# Patient Record
Sex: FEMALE | Race: WHITE | URBAN - METROPOLITAN AREA
[De-identification: names, ages, dates, MRNs, and addresses within clinical notes are randomized per-mention and may not be internally consistent; named-entity substitution may affect disease eponyms.]

---

## 2017-12-11 ENCOUNTER — EMERGENCY (EMERGENCY)
Facility: HOSPITAL | Age: 77
LOS: 1 days | Discharge: HOME | End: 2017-12-11
Attending: EMERGENCY MEDICINE
Payer: MEDICARE

## 2017-12-11 VITALS
RESPIRATION RATE: 16 BRPM | TEMPERATURE: 97 F | DIASTOLIC BLOOD PRESSURE: 73 MMHG | OXYGEN SATURATION: 99 % | HEART RATE: 68 BPM | SYSTOLIC BLOOD PRESSURE: 155 MMHG

## 2017-12-11 VITALS
TEMPERATURE: 98 F | HEART RATE: 93 BPM | RESPIRATION RATE: 17 BRPM | DIASTOLIC BLOOD PRESSURE: 94 MMHG | SYSTOLIC BLOOD PRESSURE: 173 MMHG | OXYGEN SATURATION: 98 %

## 2017-12-11 PROCEDURE — 99284 EMERGENCY DEPT VISIT MOD MDM: CPT

## 2017-12-11 PROCEDURE — 99285 EMERGENCY DEPT VISIT HI MDM: CPT

## 2017-12-11 NOTE — ED ADULT NURSE NOTE - OBJECTIVE STATEMENT
pt  Mitul stated that patient jumped out of window and went out. pt a/o x1. roam alert applied to patient.  at bedside. pt placed close to nursing station for safety.

## 2017-12-11 NOTE — ED PROVIDER NOTE - MEDICAL DECISION MAKING DETAILS
76 y/o F pt found wandering. No apparent illnesses or injuries.  prefers to take pt home and to f/u with PMD in x1-2 days. Pt to return to ED with any new or worsening sx's.

## 2017-12-11 NOTE — ED PROVIDER NOTE - OBJECTIVE STATEMENT
76 y/o F pt with PMHx of Dementia and Paranoia and no significant PSHx BIB [facility] to ED with AMS after being found wandering today. Pt's  states pt climbed out of a window last night. Per , pt has a Hx of dementia, and has wandered in the past as well. In ED, pt's behavior is noted to be at baseline with pt denying any complaints. Pt denies fever, chills, CP, SOB, abd pain, focal weakness, paresthesias, or any other complaints. Pt also denies recent injuries. NKDA.

## 2017-12-11 NOTE — ED PROVIDER NOTE - CHPI ED SYMPTOMS NEG
no fever/no chills, no chest pain, no shortness of breath, no abd pain, no focal weakness, no paresthesias

## 2017-12-11 NOTE — ED PROVIDER NOTE - PROGRESS NOTE DETAILS
Case manage paged.  states he has no services at home and would like to connect with services.  is on break.  does not want to wait. I will give her information to f/u with patient at home.

## 2017-12-11 NOTE — ED ADULT TRIAGE NOTE - CHIEF COMPLAINT QUOTE
trip and fell striking the back of her head.  Negative loc wandering wandering since last night after climbing out of the window

## 2018-01-30 ENCOUNTER — OUTPATIENT (OUTPATIENT)
Dept: OUTPATIENT SERVICES | Facility: HOSPITAL | Age: 78
LOS: 1 days | Discharge: HOME | End: 2018-01-30
Payer: MEDICARE

## 2018-01-31 DIAGNOSIS — G30.9 ALZHEIMER'S DISEASE, UNSPECIFIED: ICD-10-CM

## 2018-02-20 PROCEDURE — 99214 OFFICE O/P EST MOD 30 MIN: CPT

## 2018-03-14 PROCEDURE — 99214 OFFICE O/P EST MOD 30 MIN: CPT

## 2018-03-21 ENCOUNTER — INPATIENT (INPATIENT)
Facility: HOSPITAL | Age: 78
LOS: 5 days | Discharge: NURSING, REHAB OR LONG-TERM CARE | DRG: 57 | End: 2018-03-27
Attending: STUDENT IN AN ORGANIZED HEALTH CARE EDUCATION/TRAINING PROGRAM | Admitting: STUDENT IN AN ORGANIZED HEALTH CARE EDUCATION/TRAINING PROGRAM
Payer: MEDICARE

## 2018-03-21 VITALS
TEMPERATURE: 98 F | OXYGEN SATURATION: 98 % | SYSTOLIC BLOOD PRESSURE: 133 MMHG | HEART RATE: 68 BPM | RESPIRATION RATE: 17 BRPM | DIASTOLIC BLOOD PRESSURE: 85 MMHG

## 2018-03-21 DIAGNOSIS — R45.1 RESTLESSNESS AND AGITATION: ICD-10-CM

## 2018-03-21 DIAGNOSIS — Z01.89 ENCOUNTER FOR OTHER SPECIFIED SPECIAL EXAMINATIONS: ICD-10-CM

## 2018-03-21 DIAGNOSIS — R41.82 ALTERED MENTAL STATUS, UNSPECIFIED: ICD-10-CM

## 2018-03-21 DIAGNOSIS — F03.90 UNSPECIFIED DEMENTIA WITHOUT BEHAVIORAL DISTURBANCE: ICD-10-CM

## 2018-03-21 DIAGNOSIS — Z29.9 ENCOUNTER FOR PROPHYLACTIC MEASURES, UNSPECIFIED: ICD-10-CM

## 2018-03-21 DIAGNOSIS — F20.9 SCHIZOPHRENIA, UNSPECIFIED: ICD-10-CM

## 2018-03-21 DIAGNOSIS — Z71.89 OTHER SPECIFIED COUNSELING: ICD-10-CM

## 2018-03-21 LAB
ALBUMIN SERPL ELPH-MCNC: 3.4 G/DL — LOW (ref 3.5–5)
ALP SERPL-CCNC: 76 U/L — SIGNIFICANT CHANGE UP (ref 40–120)
ALT FLD-CCNC: 17 U/L DA — SIGNIFICANT CHANGE UP (ref 10–60)
ANION GAP SERPL CALC-SCNC: 7 MMOL/L — SIGNIFICANT CHANGE UP (ref 5–17)
APPEARANCE UR: CLEAR — SIGNIFICANT CHANGE UP
AST SERPL-CCNC: 12 U/L — SIGNIFICANT CHANGE UP (ref 10–40)
BACTERIA # UR AUTO: ABNORMAL /HPF
BASOPHILS # BLD AUTO: 0.1 K/UL — SIGNIFICANT CHANGE UP (ref 0–0.2)
BASOPHILS NFR BLD AUTO: 0.8 % — SIGNIFICANT CHANGE UP (ref 0–2)
BILIRUB SERPL-MCNC: 0.2 MG/DL — SIGNIFICANT CHANGE UP (ref 0.2–1.2)
BILIRUB UR-MCNC: NEGATIVE — SIGNIFICANT CHANGE UP
BUN SERPL-MCNC: 19 MG/DL — HIGH (ref 7–18)
CALCIUM SERPL-MCNC: 9 MG/DL — SIGNIFICANT CHANGE UP (ref 8.4–10.5)
CHLORIDE SERPL-SCNC: 107 MMOL/L — SIGNIFICANT CHANGE UP (ref 96–108)
CO2 SERPL-SCNC: 26 MMOL/L — SIGNIFICANT CHANGE UP (ref 22–31)
COLOR SPEC: SIGNIFICANT CHANGE UP
COMMENT - URINE: SIGNIFICANT CHANGE UP
CREAT SERPL-MCNC: 0.94 MG/DL — SIGNIFICANT CHANGE UP (ref 0.5–1.3)
DIFF PNL FLD: ABNORMAL
EOSINOPHIL # BLD AUTO: 0.1 K/UL — SIGNIFICANT CHANGE UP (ref 0–0.5)
EOSINOPHIL NFR BLD AUTO: 0.7 % — SIGNIFICANT CHANGE UP (ref 0–6)
EPI CELLS # UR: ABNORMAL /HPF
GLUCOSE SERPL-MCNC: 95 MG/DL — SIGNIFICANT CHANGE UP (ref 70–99)
GLUCOSE UR QL: NEGATIVE — SIGNIFICANT CHANGE UP
HCT VFR BLD CALC: 36.2 % — SIGNIFICANT CHANGE UP (ref 34.5–45)
HGB BLD-MCNC: 11.5 G/DL — SIGNIFICANT CHANGE UP (ref 11.5–15.5)
KETONES UR-MCNC: NEGATIVE — SIGNIFICANT CHANGE UP
LEUKOCYTE ESTERASE UR-ACNC: ABNORMAL
LYMPHOCYTES # BLD AUTO: 1.3 K/UL — SIGNIFICANT CHANGE UP (ref 1–3.3)
LYMPHOCYTES # BLD AUTO: 16.5 % — SIGNIFICANT CHANGE UP (ref 13–44)
MCHC RBC-ENTMCNC: 28.9 PG — SIGNIFICANT CHANGE UP (ref 27–34)
MCHC RBC-ENTMCNC: 31.7 GM/DL — LOW (ref 32–36)
MCV RBC AUTO: 91.4 FL — SIGNIFICANT CHANGE UP (ref 80–100)
MONOCYTES # BLD AUTO: 0.6 K/UL — SIGNIFICANT CHANGE UP (ref 0–0.9)
MONOCYTES NFR BLD AUTO: 7.2 % — SIGNIFICANT CHANGE UP (ref 2–14)
NEUTROPHILS # BLD AUTO: 6.1 K/UL — SIGNIFICANT CHANGE UP (ref 1.8–7.4)
NEUTROPHILS NFR BLD AUTO: 74.8 % — SIGNIFICANT CHANGE UP (ref 43–77)
NITRITE UR-MCNC: NEGATIVE — SIGNIFICANT CHANGE UP
PH UR: 5 — SIGNIFICANT CHANGE UP (ref 5–8)
PLATELET # BLD AUTO: 193 K/UL — SIGNIFICANT CHANGE UP (ref 150–400)
POTASSIUM SERPL-MCNC: 4.2 MMOL/L — SIGNIFICANT CHANGE UP (ref 3.5–5.3)
POTASSIUM SERPL-SCNC: 4.2 MMOL/L — SIGNIFICANT CHANGE UP (ref 3.5–5.3)
PROT SERPL-MCNC: 7.4 G/DL — SIGNIFICANT CHANGE UP (ref 6–8.3)
PROT UR-MCNC: NEGATIVE — SIGNIFICANT CHANGE UP
RBC # BLD: 3.96 M/UL — SIGNIFICANT CHANGE UP (ref 3.8–5.2)
RBC # FLD: 14.1 % — SIGNIFICANT CHANGE UP (ref 10.3–14.5)
RBC CASTS # UR COMP ASSIST: SIGNIFICANT CHANGE UP /HPF (ref 0–2)
SODIUM SERPL-SCNC: 140 MMOL/L — SIGNIFICANT CHANGE UP (ref 135–145)
SP GR SPEC: 1.01 — SIGNIFICANT CHANGE UP (ref 1.01–1.02)
TROPONIN I SERPL-MCNC: <0.015 NG/ML — SIGNIFICANT CHANGE UP (ref 0–0.04)
UROBILINOGEN FLD QL: NEGATIVE — SIGNIFICANT CHANGE UP
WBC # BLD: 8.1 K/UL — SIGNIFICANT CHANGE UP (ref 3.8–10.5)
WBC # FLD AUTO: 8.1 K/UL — SIGNIFICANT CHANGE UP (ref 3.8–10.5)
WBC UR QL: SIGNIFICANT CHANGE UP /HPF (ref 0–5)

## 2018-03-21 PROCEDURE — 70450 CT HEAD/BRAIN W/O DYE: CPT | Mod: 26

## 2018-03-21 PROCEDURE — 71045 X-RAY EXAM CHEST 1 VIEW: CPT | Mod: 26

## 2018-03-21 PROCEDURE — 99285 EMERGENCY DEPT VISIT HI MDM: CPT

## 2018-03-21 RX ORDER — QUETIAPINE FUMARATE 200 MG/1
25 TABLET, FILM COATED ORAL
Qty: 0 | Refills: 0 | Status: DISCONTINUED | OUTPATIENT
Start: 2018-03-21 | End: 2018-03-22

## 2018-03-21 RX ORDER — ENOXAPARIN SODIUM 100 MG/ML
40 INJECTION SUBCUTANEOUS DAILY
Qty: 0 | Refills: 0 | Status: DISCONTINUED | OUTPATIENT
Start: 2018-03-21 | End: 2018-03-27

## 2018-03-21 RX ORDER — LANOLIN ALCOHOL/MO/W.PET/CERES
5 CREAM (GRAM) TOPICAL AT BEDTIME
Qty: 0 | Refills: 0 | Status: DISCONTINUED | OUTPATIENT
Start: 2018-03-21 | End: 2018-03-27

## 2018-03-21 RX ORDER — SODIUM CHLORIDE 9 MG/ML
1000 INJECTION, SOLUTION INTRAVENOUS
Qty: 0 | Refills: 0 | Status: DISCONTINUED | OUTPATIENT
Start: 2018-03-21 | End: 2018-03-27

## 2018-03-21 RX ADMIN — QUETIAPINE FUMARATE 25 MILLIGRAM(S): 200 TABLET, FILM COATED ORAL at 21:01

## 2018-03-21 RX ADMIN — Medication 5 MILLIGRAM(S): at 21:46

## 2018-03-21 NOTE — H&P ADULT - PROBLEM SELECTOR PLAN 4
IMPROVE VTE score: 4 Lovenox S/C  [ ] Previous VTE                                    3  [ ] Thrombophilia                                  2  [ x] Lower limb paralysis                        2  (unable to hold up >15 seconds)    [ ] Current Cancer (within 6 months)        2   [x] Immobilization > 24 hrs                    1  [ ] ICU/CCU stay > 24 hrs                      1  [x] Age > 60                                         1

## 2018-03-21 NOTE — H&P ADULT - ATTENDING COMMENTS
Agree with above   Patient was seen and examined in ED with her son and  being present during examination. She is 78 y/o Female from home lives with  w/ significant PMHx of Dementia and Agitation (recently started Olanzapine as outpatient) and no significant PSHx BIB family with c/o altered mental status since 7pm last night.   Per patient ,  patient soiled her self at 7pm last night that was a deviation from pt's baseline. Patient's  reports the pt soiled her self a second time last night while watching TV and while allow pt to clean herself she again experienced confusion. Patient was placed in bed at 10pm, but she awoke x5 times in the night with episodes of hyperactivity. Patient was reported to be erratic and very disoriented around their home.   ROS and PE as above     1. Acute change in altered mental status  No signs of infection so far: afebrile, labs within normal limits, UA (-), CXR (-)  CT head negative   Will get MRI of brain   Hold olanzipine for now   Can give Seroquel 12.5 mg PO at bedtime   Will get Psychiatric evaluation   Neuro evaluation depending on MRI reports    The rest as above     Plan of care discussed with patient  and son

## 2018-03-21 NOTE — H&P ADULT - HISTORY OF PRESENT ILLNESS
76 y/o Female w/ significant PMHx of dementia and paranoia(Schizophrenia) and no significant PSHx BIB family with c/o altered mental status since 7pm last night. Per pt's  pt soiled her self at 7pm last night that was a deviation from pt's baseline. Pt's  notes that while attempting to allow the pt to clean herself, pt appears to be confused by the function of soap. Pt's  reports the pt soiled her self a second time last night while watching TV and while allow pt to clean herself she again experienced confusion. Pt was placed in bed at 10pm, but she awoke x5 times in the night with episodes of hyperactivity. Pt was reported to be "running around the house" and "climbing fences". Pt's  reports that the pt visits a psychiatrist at [facility]. Pt'  denies observing any vomiting, pain, apparent injuries, falls, recent fevers, diarrhea, blood in stool, SOB, urinary symptoms, or any other complaints. Pt is current taking Olanzapine. Allergie to penicillin. PMD Dr. Gabriel Villalobos. 78 y/o Female w/ significant PMHx of Dementia and Paranoia(Schizophrenia on olanzapine) and no significant PSHx BIB family with c/o altered mental status since 7pm last night. Per patient  patient soiled her self at 7pm last night that was a deviation from pt's baseline. Pt's  notes that while attempting to allow the pt to clean herself. Pt's  reports the pt soiled her self a second time last night while watching TV and while allow pt to clean herself she again experienced confusion. Pt was placed in bed at 10pm, but she awoke x5 times in the night with episodes of hyperactivity. Pt was reported to be "running around the house" and "climbing fences". Pt's  reports that the pt visits a psychiatrist at [facility] who gave her the olanzapine.   Arabellanet  denies observing any vomiting, pain, apparent injuries, falls, recent fevers, diarrhea, blood in stool, SOB, urinary symptoms, or any other complaints. 78 y/o Female from home lives with  w/ significant PMHx of Dementia and Agitation (PCP on olanzapine) and no significant PSHx BIB family with c/o altered mental status since 7pm last night. Per patient  patient soiled her self at 7pm last night that was a deviation from pt's baseline. Pt's  notes that while attempting to allow the pt to clean herself. Pt's  reports the pt soiled her self a second time last night while watching TV and while allow pt to clean herself she again experienced confusion. Pt was placed in bed at 10pm, but she awoke x5 times in the night with episodes of hyperactivity. Pt was reported to be "running around the house" and "climbing fences". Pt's  reports that the pt visits a psychiatrist at [facility] who gave her the olanzapine.   Patient  denies observing any vomiting, pain, apparent injuries, falls, recent fevers, diarrhea, blood in stool, SOB, urinary symptoms, or any other complaints. 76 y/o Female from home lives with  w/ significant PMHx of Dementia and Agitation Psychiatry outpatient started her on olanzapine) and no significant PSHx BIB family with c/o altered mental status since 7pm last night. Per patient  patient soiled her self at 7pm last night that was a deviation from pt's baseline. Pt's  notes that while attempting to allow the pt to clean herself. Pt's  reports the pt soiled her self a second time last night while watching TV and while allow pt to clean herself she again experienced confusion. Pt was placed in bed at 10pm, but she awoke x5 times in the night with episodes of hyperactivity. Pt was reported to be "running around the house" and "climbing fences". Pt's  reports that the pt visits a psychiatrist at [facility] who gave her the olanzapine.   Patient  denies observing any vomiting, pain, apparent injuries, falls, recent fevers, diarrhea, blood in stool, SOB, urinary symptoms, or any other complaints. 76 y/o Female from home lives with  w/ significant PMHx of Dementia and Agitation Psychiatry outpatient started her on olanzapine) and no significant PSHx BIB family with c/o altered mental status since 7pm last night. Per patient  patient soiled her self at 7pm last night that was a deviation from pt's baseline.Patient's  reports the pt soiled her self a second time last night while watching TV and while allow pt to clean herself she again experienced confusion. Patient was placed in bed at 10pm, but she awoke x5 times in the night with episodes of hyperactivity. Patient was reported to be erratic and very disoriented around their home. Patient's  reports that the pt visits a psychiatrist at [facility] who gave her the olanzapine.   Patient  denies observing any vomiting, pain, apparent injuries, falls, recent fevers, diarrhea, blood in stool, SOB, urinary symptoms, or any other complaints.

## 2018-03-21 NOTE — ED PROVIDER NOTE - OBJECTIVE STATEMENT
76 y/o F w/ significant PMHx of dementia and paranoia and no significant PSHx BIB family with c/o altered mental status since 7pm last night. Per pt's  pt soiled her self at 7pm last night that was a deviation from pt's baseline. Pt's  notes that while attempting to allow the pt to clean herself, pt appears to be confused by the function of soap. Pt's  reports the pt soiled her self a second time last night while watching TV and while allow pt to clean herself she again experienced confusion. Pt was placed in bed at 10pm, but she awoke x5 times in the night with episodes of hyperactivity. Pt was reported to be "running around the house" and "climbing fences". Pt's  reports that the pt visits a psychiatrist at [facility]. Pt'  denies observing any vomiting, pain, apparent injuries, falls, recent fevers, diarrhea, blood in stool, SOB, urinary symptoms, or any other complaints. NKDA. PMD Dr. Gabriel Villalobos. 78 y/o F w/ significant PMHx of dementia and paranoia and no significant PSHx BIB family with c/o altered mental status since 7pm last night. Per pt's  pt soiled her self at 7pm last night that was a deviation from pt's baseline. Pt's  notes that while attempting to allow the pt to clean herself, pt appears to be confused by the function of soap. Pt's  reports the pt soiled her self a second time last night while watching TV and while allow pt to clean herself she again experienced confusion. Pt was placed in bed at 10pm, but she awoke x5 times in the night with episodes of hyperactivity. Pt was reported to be "running around the house" and "climbing fences". Pt's  reports that the pt visits a psychiatrist at [facility]. Pt'  denies observing any vomiting, pain, apparent injuries, falls, recent fevers, diarrhea, blood in stool, SOB, urinary symptoms, or any other complaints. Allergie to penicillin. PMD Dr. Gabriel Villalobos. 78 y/o F w/ significant PMHx of dementia and paranoia and no significant PSHx BIB family with c/o altered mental status since 7pm last night. Per pt's  pt soiled her self at 7pm last night that was a deviation from pt's baseline. Pt's  notes that while attempting to allow the pt to clean herself, pt appears to be confused by the function of soap. Pt's  reports the pt soiled her self a second time last night while watching TV and while allow pt to clean herself she again experienced confusion. Pt was placed in bed at 10pm, but she awoke x5 times in the night with episodes of hyperactivity. Pt was reported to be "running around the house" and "climbing fences". Pt's  reports that the pt visits a psychiatrist at [facility]. Pt'  denies observing any vomiting, pain, apparent injuries, falls, recent fevers, diarrhea, blood in stool, SOB, urinary symptoms, or any other complaints. Pt is current taking Olanzapine. Allergie to penicillin. PMD Dr. Gabriel Villalobos.

## 2018-03-21 NOTE — ED PROVIDER NOTE - PROGRESS NOTE DETAILS
Pt with persistent AMS, will admit for futher eval/management.  Initial ED w/u still pending UA but otherwise no etiology identified; worsening dementia considered as well.    -Dr. Schulz informed for admission (as Dr. Gabriel Villalobos is PMD).   -Paged MAR.

## 2018-03-21 NOTE — H&P ADULT - ASSESSMENT
76 y/o Female w/ significant PMHx of Dementia and Paranoia(Schizophrenia on olanzapine) and no significant PSHx BIB family with c/o altered mental status since 7pm last night. Per patient  patient soiled her self at 7pm last night that was a deviation from pt's baseline. Pt's  notes that while attempting to allow the pt to clean herself. Pt's  reports the pt soiled her self a second time last night while watching TV and while allow pt to clean herself she again experienced confusion. Pt was placed in bed at 10pm, but she awoke x5 times in the night with episodes of hyperactivity. Pt was reported to be "running around the house" and "climbing fences". Pt's  reports that the pt visits a psychiatrist at [facility] who gave her the olanzapine.   Patient  denies observing any vomiting, pain, apparent injuries, falls, recent fevers, diarrhea, blood in stool, SOB, urinary symptoms, or any other complaints. 76 y/o Female w/ significant PMHx of Dementia and Paranoia(Schizophrenia on olanzapine) and no significant PSHx BIB family with c/o altered mental status since 7pm last night. Per patient  patient soiled her self at 7pm last night that was a deviation from pt's baseline.    Admitted to the floor for Altered mental status likely secondary to worsening dementia and schizophrenia 78 y/o Female from home lives with  w/ significant PMHx of Dementia and Agitation (PCP on olanzapine) and no significant PSHx BIB family with c/o altered mental status since 7pm last night. Per patient  patient soiled her self at 7pm last night that was a deviation from pt's baseline.    Admitted to the floor for Altered mental status likely secondary to Likely from worsening dementia vs CVA vs Medication induced (Olanzapine). 78 y/o Female from home lives with  w/ significant PMHx of Dementia and Agitation (PCP started her on olanzapine) and no significant PSHx BIB family with c/o altered mental status since 7pm last night. Per patient  patient soiled her self at 7pm last night that was a deviation from pt's baseline.    Admitted to the floor for Altered mental status likely secondary to Likely from worsening dementia vs CVA vs Medication induced (Olanzapine).

## 2018-03-21 NOTE — H&P ADULT - NSHPLABSRESULTS_GEN_ALL_CORE
Vital Signs Last 24 Hrs  T(C): 36.5 (21 Mar 2018 16:10), Max: 36.6 (21 Mar 2018 12:34)  T(F): 97.7 (21 Mar 2018 16:10), Max: 97.8 (21 Mar 2018 12:34)  HR: 75 (21 Mar 2018 16:10) (68 - 75)  BP: 131/84 (21 Mar 2018 16:10) (131/84 - 133/85)  BP(mean): --  RR: 17 (21 Mar 2018 16:10) (17 - 17)  SpO2: 99% (21 Mar 2018 16:10) (98% - 99%)

## 2018-03-21 NOTE — ED PROVIDER NOTE - MEDICAL DECISION MAKING DETAILS
76 y/o F presents to ED with altered mental status since 7 pm last night, no focal neurological deficit; will get CT brain, labs, CXR, UA, EKG, and admit.

## 2018-03-21 NOTE — ED PROVIDER NOTE - CHPI ED SYMPTOMS NEG
no fever/no nausea/no vomiting/no chill, no apparent injury, no diarrhea, no blood in stool, no shortness of breath, no urinary symptoms, no falls, no pain

## 2018-03-21 NOTE — H&P ADULT - PROBLEM SELECTOR PLAN 1
Likely from worsening dementia and Schizophrenia  Came with Disorientation and disorganized speech. Patient is AAOX1 only  Infectious work up was done: No UTI or Pneumonia. No WBC or fever. No respiratory.   Neurological work up was done: CT showing  Patchy hypo densities in the periventricular and subcortical white matter bilaterally, compatible with chronic small vessel ischemic disease.  Psychiatry evaluation Dr Mcekon Likely from worsening dementia vs CVA vs Medication induced (Olanzapine)  Came with Disorientation and disorganized speech. Patient is AAOX1 only  Infectious work up was done: No UTI or Pneumonia. No WBC or fever. No respiratory.   Neurological work up was done: CT showing Patchy hypo densities in the periventricular and subcortical white matter bilaterally, compatible with chronic small vessel ischemic disease. NO EVIDENCE OF ACUTE BLEEDING.  Will need MRI to R/O Acute bleeding  Psychiatry evaluation Dr Mckeon Likely from worsening dementia vs CVA vs Medication induced (Olanzapine)  Came with Disorientation and disorganized speech. Patient is AAOX1 only  Infectious work up was done: No UTI or Pneumonia. No WBC or fever. No respiratory symtomps   Neurological work up was done: CT showing Patchy hypo densities in the periventricular and subcortical white matter bilaterally, compatible with chronic small vessel ischemic disease. NO EVIDENCE OF ACUTE BLEEDING.  Will need MRI to R/O Acute bleeding  Psychiatry evaluation Dr Mckeon

## 2018-03-21 NOTE — H&P ADULT - PROBLEM SELECTOR PLAN 2
Worsening dementia with psychotic behavior? on Olanzapine  Will hold off on Olanzapine  Psychiatry evaluation Worsening dementia will R/O Stroke/CVA   Will hold off on Olanzapine  Psychiatry evaluation Worsening dementia will R/O Stroke/CVA   Will hold off on Olanzapine  Psychiatry evaluation Dr Mckeon

## 2018-03-21 NOTE — ED ADULT NURSE NOTE - OBJECTIVE STATEMENT
as per the  pt was incontinent last night unable to sleep , pt not able to recognized family members. pt on olanzapine

## 2018-03-21 NOTE — ED PROVIDER NOTE - CONSTITUTIONAL MENTATION
oriented to person but not time, place, or situation, follows verbal command, no slurred speech, verbal responses are apparent word salad,/alert/awake

## 2018-03-22 DIAGNOSIS — G30.9 ALZHEIMER'S DISEASE, UNSPECIFIED: ICD-10-CM

## 2018-03-22 LAB
ANION GAP SERPL CALC-SCNC: 6 MMOL/L — SIGNIFICANT CHANGE UP (ref 5–17)
BASOPHILS # BLD AUTO: 0.1 K/UL — SIGNIFICANT CHANGE UP (ref 0–0.2)
BASOPHILS NFR BLD AUTO: 0.9 % — SIGNIFICANT CHANGE UP (ref 0–2)
BUN SERPL-MCNC: 17 MG/DL — SIGNIFICANT CHANGE UP (ref 7–18)
CALCIUM SERPL-MCNC: 9 MG/DL — SIGNIFICANT CHANGE UP (ref 8.4–10.5)
CHLORIDE SERPL-SCNC: 106 MMOL/L — SIGNIFICANT CHANGE UP (ref 96–108)
CHOLEST SERPL-MCNC: 166 MG/DL — SIGNIFICANT CHANGE UP (ref 10–199)
CO2 SERPL-SCNC: 29 MMOL/L — SIGNIFICANT CHANGE UP (ref 22–31)
CREAT SERPL-MCNC: 0.98 MG/DL — SIGNIFICANT CHANGE UP (ref 0.5–1.3)
CULTURE RESULTS: NO GROWTH — SIGNIFICANT CHANGE UP
EOSINOPHIL # BLD AUTO: 0.1 K/UL — SIGNIFICANT CHANGE UP (ref 0–0.5)
EOSINOPHIL NFR BLD AUTO: 1.8 % — SIGNIFICANT CHANGE UP (ref 0–6)
FOLATE SERPL-MCNC: 8.6 NG/ML — SIGNIFICANT CHANGE UP (ref 4.8–24.2)
GLUCOSE SERPL-MCNC: 92 MG/DL — SIGNIFICANT CHANGE UP (ref 70–99)
HBA1C BLD-MCNC: 5.8 % — HIGH (ref 4–5.6)
HCT VFR BLD CALC: 36.9 % — SIGNIFICANT CHANGE UP (ref 34.5–45)
HDLC SERPL-MCNC: 51 MG/DL — SIGNIFICANT CHANGE UP (ref 40–125)
HGB BLD-MCNC: 12 G/DL — SIGNIFICANT CHANGE UP (ref 11.5–15.5)
LIPID PNL WITH DIRECT LDL SERPL: 96 MG/DL — SIGNIFICANT CHANGE UP
LYMPHOCYTES # BLD AUTO: 1.2 K/UL — SIGNIFICANT CHANGE UP (ref 1–3.3)
LYMPHOCYTES # BLD AUTO: 17 % — SIGNIFICANT CHANGE UP (ref 13–44)
MCHC RBC-ENTMCNC: 29.6 PG — SIGNIFICANT CHANGE UP (ref 27–34)
MCHC RBC-ENTMCNC: 32.5 GM/DL — SIGNIFICANT CHANGE UP (ref 32–36)
MCV RBC AUTO: 91.1 FL — SIGNIFICANT CHANGE UP (ref 80–100)
MONOCYTES # BLD AUTO: 0.6 K/UL — SIGNIFICANT CHANGE UP (ref 0–0.9)
MONOCYTES NFR BLD AUTO: 8.1 % — SIGNIFICANT CHANGE UP (ref 2–14)
NEUTROPHILS # BLD AUTO: 5.1 K/UL — SIGNIFICANT CHANGE UP (ref 1.8–7.4)
NEUTROPHILS NFR BLD AUTO: 72.2 % — SIGNIFICANT CHANGE UP (ref 43–77)
PLATELET # BLD AUTO: 206 K/UL — SIGNIFICANT CHANGE UP (ref 150–400)
POTASSIUM SERPL-MCNC: 4.2 MMOL/L — SIGNIFICANT CHANGE UP (ref 3.5–5.3)
POTASSIUM SERPL-SCNC: 4.2 MMOL/L — SIGNIFICANT CHANGE UP (ref 3.5–5.3)
RBC # BLD: 4.05 M/UL — SIGNIFICANT CHANGE UP (ref 3.8–5.2)
RBC # FLD: 13.8 % — SIGNIFICANT CHANGE UP (ref 10.3–14.5)
SODIUM SERPL-SCNC: 141 MMOL/L — SIGNIFICANT CHANGE UP (ref 135–145)
SPECIMEN SOURCE: SIGNIFICANT CHANGE UP
T PALLIDUM AB TITR SER: NEGATIVE — SIGNIFICANT CHANGE UP
TOTAL CHOLESTEROL/HDL RATIO MEASUREMENT: 3.3 RATIO — SIGNIFICANT CHANGE UP (ref 3.3–7.1)
TRIGL SERPL-MCNC: 94 MG/DL — SIGNIFICANT CHANGE UP (ref 10–149)
VIT B12 SERPL-MCNC: 584 PG/ML — SIGNIFICANT CHANGE UP (ref 232–1245)
WBC # BLD: 7.1 K/UL — SIGNIFICANT CHANGE UP (ref 3.8–10.5)
WBC # FLD AUTO: 7.1 K/UL — SIGNIFICANT CHANGE UP (ref 3.8–10.5)

## 2018-03-22 PROCEDURE — 99223 1ST HOSP IP/OBS HIGH 75: CPT | Mod: AI,GC

## 2018-03-22 PROCEDURE — 70551 MRI BRAIN STEM W/O DYE: CPT | Mod: 26

## 2018-03-22 PROCEDURE — 99223 1ST HOSP IP/OBS HIGH 75: CPT

## 2018-03-22 RX ORDER — QUETIAPINE FUMARATE 200 MG/1
12.5 TABLET, FILM COATED ORAL
Qty: 0 | Refills: 0 | Status: DISCONTINUED | OUTPATIENT
Start: 2018-03-22 | End: 2018-03-27

## 2018-03-22 RX ORDER — QUETIAPINE FUMARATE 200 MG/1
12.5 TABLET, FILM COATED ORAL AT BEDTIME
Qty: 0 | Refills: 0 | Status: DISCONTINUED | OUTPATIENT
Start: 2018-03-22 | End: 2018-03-27

## 2018-03-22 RX ADMIN — Medication 1 MILLIGRAM(S): at 18:13

## 2018-03-22 RX ADMIN — QUETIAPINE FUMARATE 12.5 MILLIGRAM(S): 200 TABLET, FILM COATED ORAL at 23:41

## 2018-03-22 RX ADMIN — Medication 1 MILLIGRAM(S): at 23:44

## 2018-03-22 RX ADMIN — QUETIAPINE FUMARATE 25 MILLIGRAM(S): 200 TABLET, FILM COATED ORAL at 05:29

## 2018-03-22 RX ADMIN — Medication 5 MILLIGRAM(S): at 23:40

## 2018-03-22 RX ADMIN — QUETIAPINE FUMARATE 12.5 MILLIGRAM(S): 200 TABLET, FILM COATED ORAL at 23:44

## 2018-03-22 RX ADMIN — SODIUM CHLORIDE 75 MILLILITER(S): 9 INJECTION, SOLUTION INTRAVENOUS at 06:33

## 2018-03-22 NOTE — BEHAVIORAL HEALTH ASSESSMENT NOTE - RISK ASSESSMENT
increased risk due to dementia, acute risk due to new onset confusion; protective factors are no high risk history

## 2018-03-22 NOTE — BEHAVIORAL HEALTH ASSESSMENT NOTE - SUMMARY
79 y/o F with h/o Alzheimers who was started on Zyprexa 2weeks ago az [facility] outpatient clinic by Dr Lepe was BIB  for new onset agitation, confusion and urinary incontinence without a clear infectious process or CVA going on. Zyprexa was switched to Seroquel. 77 y/o F with h/o Alzheimers who was started on Zyprexa 2weeks ago az [facility] outpatient clinic by Dr Lepe was BIB  for new onset agitation, confusion and urinary incontinence without a clear infectious process or CVA going on. Zyprexa was switched to Seroquel. Awaiting callback from  for more information and history.

## 2018-03-22 NOTE — BEHAVIORAL HEALTH ASSESSMENT NOTE - TELEPSYCHIATRY?
2 RN skin check completed with Amando RN: R ear red, but blanching, Foam ear pads placed on O2 tubing. Elbows pink and blanching. Feet dry, flaky, calloused, cracking bilaterally. Small red, blanching spot on medial aspect of pt's L foot d/t pt crossing legs. Otherwise, no skin abnormalities noted.    No

## 2018-03-22 NOTE — BEHAVIORAL HEALTH ASSESSMENT NOTE - HPI (INCLUDE ILLNESS QUALITY, SEVERITY, DURATION, TIMING, CONTEXT, MODIFYING FACTORS, ASSOCIATED SIGNS AND SYMPTOMS)
79 y/o F with h/o Alzheimers who was started on Zyprexa 2weeks ago az [facility] outpatient clinic by Dr Lepe was BIB  for new onset agitation, confusion and urinary incontinence.   Patient was seen and examined in ED with her son and  being present during examination. She is 76 y/o Female from home lives with  w/ significant PMHx of Dementia and Agitation (recently started Olanzapine as outpatient) and no significant PSHx BIB family with c/o altered mental status since 7pm last night.   As per MD note; "Per patient , patient soiled her self at 7pm last night that was a deviation from pt's baseline. Patient's  reports the pt soiled her self a second time last night while watching TV and while allow pt to clean herself she again experienced confusion. Patient was placed in bed at 10pm, but she awoke x5 times in the night with episodes of hyperactivity. Patient was reported to be erratic and very disoriented around their home"  No infectious cause was found, CT was negative for acute bleed, MRI pending. Pt disoriented and nonsensical on eval she says she is at "104" and "there are a lot of things in the middle" unable to answer questions.  Called [facility] to obtain info from Dr Lepe (no access to records from this computer) however she is not in today; she will be in tomorrow ([phone])  Could not obtain collateral from family, no one by the bed, no answer to phone calls. It is unknown at this point how pt had been doing on the zyprexa up until yesterday or whether or not she has been compliant with it.  As per PCA she has been restless in the AM however after she was taken to the bathroom and passed urine and stool she was calm and fell asleep; no new episode of incontinence since in the ER. Pt able to be redirected. 79 y/o F with h/o Alzheimers who was started on Zyprexa 2weeks ago az [facility] outpatient clinic by Dr Lepe was BIB  for new onset agitation, confusion and urinary incontinence.     As per MD note; "Per patient , patient soiled her self at 7pm last night that was a deviation from pt's baseline. Patient's  reports the pt soiled her self a second time last night while watching TV and while allow pt to clean herself she again experienced confusion. Patient was placed in bed at 10pm, but she awoke x5 times in the night with episodes of hyperactivity. Patient was reported to be erratic and very disoriented around their home"  No infectious cause was found, CT was negative for acute bleed, MRI pending. Pt disoriented and nonsensical on eval she says she is at "104" and "there are a lot of things in the middle" unable to answer questions.  Called [facility] to obtain info from Dr Lepe (no access to records from this computer) however she is not in today; she will be in tomorrow ([phone])  Could not obtain collateral from family, no one by the bed, no answer to phone calls. It is unknown at this point how pt had been doing on the zyprexa up until yesterday or whether or not she has been compliant with it.  As per PCA she has been restless in the AM however after she was taken to the bathroom and passed urine and stool she was calm and fell asleep; no new episode of incontinence since in the ER. Pt able to be redirected. 77 y/o F with h/o Alzheimers who was started on Zyprexa 2weeks ago az [facility] outpatient clinic by Dr Lepe was BIB  for new onset agitation, confusion and urinary incontinence.     As per MD note; "Per patient , patient soiled her self at 7pm last night that was a deviation from pt's baseline. Patient's  reports the pt soiled her self a second time last night while watching TV and while allow pt to clean herself she again experienced confusion. Patient was placed in bed at 10pm, but she awoke x5 times in the night with episodes of hyperactivity. Patient was reported to be erratic and very disoriented around their home"  No infectious cause was found, CT was negative for acute bleed, MRI pending. Pt disoriented and nonsensical on eval she says she is at "104" and "there are a lot of things in the middle" unable to answer questions.  Called [facility] to obtain info from Dr Lepe (no access to records from this computer) however she is not in today; she will be in tomorrow ([phone])  Could not obtain collateral from family, no one by the bed, no answer to phone calls to home or cell number. It is unknown at this point how pt had been doing on the zyprexa up until yesterday or whether or not she has been compliant with it.  As per PCA she has been restless in the AM however after she was taken to the bathroom and passed urine and stool she was calm and fell asleep; no new episode of incontinence since in the ER. Pt able to be redirected.

## 2018-03-23 LAB
ALBUMIN SERPL ELPH-MCNC: 3.1 G/DL — LOW (ref 3.5–5)
ALP SERPL-CCNC: 60 U/L — SIGNIFICANT CHANGE UP (ref 40–120)
ALT FLD-CCNC: 13 U/L DA — SIGNIFICANT CHANGE UP (ref 10–60)
ANION GAP SERPL CALC-SCNC: 5 MMOL/L — SIGNIFICANT CHANGE UP (ref 5–17)
AST SERPL-CCNC: 9 U/L — LOW (ref 10–40)
BILIRUB SERPL-MCNC: 0.4 MG/DL — SIGNIFICANT CHANGE UP (ref 0.2–1.2)
BUN SERPL-MCNC: 16 MG/DL — SIGNIFICANT CHANGE UP (ref 7–18)
CALCIUM SERPL-MCNC: 8.7 MG/DL — SIGNIFICANT CHANGE UP (ref 8.4–10.5)
CHLORIDE SERPL-SCNC: 108 MMOL/L — SIGNIFICANT CHANGE UP (ref 96–108)
CO2 SERPL-SCNC: 28 MMOL/L — SIGNIFICANT CHANGE UP (ref 22–31)
CREAT SERPL-MCNC: 0.76 MG/DL — SIGNIFICANT CHANGE UP (ref 0.5–1.3)
GLUCOSE SERPL-MCNC: 92 MG/DL — SIGNIFICANT CHANGE UP (ref 70–99)
MAGNESIUM SERPL-MCNC: 2.3 MG/DL — SIGNIFICANT CHANGE UP (ref 1.6–2.6)
PHOSPHATE SERPL-MCNC: 3.8 MG/DL — SIGNIFICANT CHANGE UP (ref 2.5–4.5)
POTASSIUM SERPL-MCNC: 4 MMOL/L — SIGNIFICANT CHANGE UP (ref 3.5–5.3)
POTASSIUM SERPL-SCNC: 4 MMOL/L — SIGNIFICANT CHANGE UP (ref 3.5–5.3)
PROT SERPL-MCNC: 6.5 G/DL — SIGNIFICANT CHANGE UP (ref 6–8.3)
SODIUM SERPL-SCNC: 141 MMOL/L — SIGNIFICANT CHANGE UP (ref 135–145)

## 2018-03-23 PROCEDURE — 99233 SBSQ HOSP IP/OBS HIGH 50: CPT | Mod: GC

## 2018-03-23 RX ORDER — HALOPERIDOL DECANOATE 100 MG/ML
1 INJECTION INTRAMUSCULAR ONCE
Qty: 0 | Refills: 0 | Status: COMPLETED | OUTPATIENT
Start: 2018-03-23 | End: 2018-03-23

## 2018-03-23 RX ADMIN — HALOPERIDOL DECANOATE 1 MILLIGRAM(S): 100 INJECTION INTRAMUSCULAR at 12:55

## 2018-03-23 RX ADMIN — ENOXAPARIN SODIUM 40 MILLIGRAM(S): 100 INJECTION SUBCUTANEOUS at 12:56

## 2018-03-23 NOTE — PROGRESS NOTE BEHAVIORAL HEALTH - SUMMARY
Patient was evaluated chart was reviewed,initial psych eval appreciated.  Patients  seen.  Patient is unable to provide her PMH and current condition.  She is somewhat sedated,minimally verbal,behaviorally unpredictable.  has paranoid ideations.Unable to do formal MSE and MMSE due to confusional uncooperative state.

## 2018-03-23 NOTE — ED ADULT NURSE REASSESSMENT NOTE - NS ED NURSE REASSESS COMMENT FT1
pr.remained    stable. pt. was restless  last night at 2344 hsldol 1 mg IM given. pt. sleeping in bed comfortably. /52 HR 46  aware with no new order made. cotnr.on 1:1 observatrion.pt.not in distress

## 2018-03-23 NOTE — PROGRESS NOTE BEHAVIORAL HEALTH - AFFECT RANGE
Constricted No Residual Tumor Seen Histology Text: There were no malignant cells seen in the sections examined.

## 2018-03-24 LAB
ANION GAP SERPL CALC-SCNC: 6 MMOL/L — SIGNIFICANT CHANGE UP (ref 5–17)
BUN SERPL-MCNC: 13 MG/DL — SIGNIFICANT CHANGE UP (ref 7–18)
CALCIUM SERPL-MCNC: 8.7 MG/DL — SIGNIFICANT CHANGE UP (ref 8.4–10.5)
CHLORIDE SERPL-SCNC: 107 MMOL/L — SIGNIFICANT CHANGE UP (ref 96–108)
CO2 SERPL-SCNC: 28 MMOL/L — SIGNIFICANT CHANGE UP (ref 22–31)
CREAT SERPL-MCNC: 0.96 MG/DL — SIGNIFICANT CHANGE UP (ref 0.5–1.3)
GLUCOSE SERPL-MCNC: 92 MG/DL — SIGNIFICANT CHANGE UP (ref 70–99)
HCT VFR BLD CALC: 34.4 % — LOW (ref 34.5–45)
HGB BLD-MCNC: 11 G/DL — LOW (ref 11.5–15.5)
MCHC RBC-ENTMCNC: 29.1 PG — SIGNIFICANT CHANGE UP (ref 27–34)
MCHC RBC-ENTMCNC: 32 GM/DL — SIGNIFICANT CHANGE UP (ref 32–36)
MCV RBC AUTO: 91 FL — SIGNIFICANT CHANGE UP (ref 80–100)
PLATELET # BLD AUTO: 183 K/UL — SIGNIFICANT CHANGE UP (ref 150–400)
POTASSIUM SERPL-MCNC: 4.1 MMOL/L — SIGNIFICANT CHANGE UP (ref 3.5–5.3)
POTASSIUM SERPL-SCNC: 4.1 MMOL/L — SIGNIFICANT CHANGE UP (ref 3.5–5.3)
RBC # BLD: 3.78 M/UL — LOW (ref 3.8–5.2)
RBC # FLD: 13.5 % — SIGNIFICANT CHANGE UP (ref 10.3–14.5)
SODIUM SERPL-SCNC: 141 MMOL/L — SIGNIFICANT CHANGE UP (ref 135–145)
WBC # BLD: 5.6 K/UL — SIGNIFICANT CHANGE UP (ref 3.8–10.5)
WBC # FLD AUTO: 5.6 K/UL — SIGNIFICANT CHANGE UP (ref 3.8–10.5)

## 2018-03-24 PROCEDURE — 99232 SBSQ HOSP IP/OBS MODERATE 35: CPT | Mod: GC

## 2018-03-24 RX ORDER — DIPHENHYDRAMINE HCL 50 MG
25 CAPSULE ORAL ONCE
Qty: 0 | Refills: 0 | Status: COMPLETED | OUTPATIENT
Start: 2018-03-24 | End: 2018-03-24

## 2018-03-24 RX ORDER — HALOPERIDOL DECANOATE 100 MG/ML
1 INJECTION INTRAMUSCULAR ONCE
Qty: 0 | Refills: 0 | Status: COMPLETED | OUTPATIENT
Start: 2018-03-24 | End: 2018-03-24

## 2018-03-24 RX ADMIN — SODIUM CHLORIDE 75 MILLILITER(S): 9 INJECTION, SOLUTION INTRAVENOUS at 01:00

## 2018-03-24 RX ADMIN — HALOPERIDOL DECANOATE 1 MILLIGRAM(S): 100 INJECTION INTRAMUSCULAR at 21:02

## 2018-03-24 RX ADMIN — ENOXAPARIN SODIUM 40 MILLIGRAM(S): 100 INJECTION SUBCUTANEOUS at 11:39

## 2018-03-24 RX ADMIN — Medication 25 MILLIGRAM(S): at 21:02

## 2018-03-24 RX ADMIN — QUETIAPINE FUMARATE 12.5 MILLIGRAM(S): 200 TABLET, FILM COATED ORAL at 21:02

## 2018-03-24 RX ADMIN — Medication 5 MILLIGRAM(S): at 21:02

## 2018-03-24 RX ADMIN — QUETIAPINE FUMARATE 12.5 MILLIGRAM(S): 200 TABLET, FILM COATED ORAL at 09:31

## 2018-03-24 NOTE — PHYSICAL THERAPY INITIAL EVALUATION ADULT - GENERAL OBSERVATIONS, REHAB EVAL
Pt seen supine in bed w/no lines attached, pt was cooperative during assessment, denied any c/o pain

## 2018-03-24 NOTE — PATIENT PROFILE ADULT. - MEDICATION, ABILITY TO FOLLOW SCHEDULE, PROFILE
inability to prepare and administer dose correctly/lack of knowledge regarding medication purpose/lack of knowledge regarding managing health concern

## 2018-03-24 NOTE — PHYSICAL THERAPY INITIAL EVALUATION ADULT - LEVEL OF INDEPENDENCE: GAIT, REHAB EVAL
pt has poor safety awareness& needs min A to maneuver the RW especially when negotiating tight spaces and obstacles/contact guard/minimum assist (75% patients effort)

## 2018-03-25 PROCEDURE — 99232 SBSQ HOSP IP/OBS MODERATE 35: CPT | Mod: GC

## 2018-03-25 RX ORDER — HALOPERIDOL DECANOATE 100 MG/ML
1 INJECTION INTRAMUSCULAR ONCE
Qty: 0 | Refills: 0 | Status: COMPLETED | OUTPATIENT
Start: 2018-03-25 | End: 2018-03-25

## 2018-03-25 RX ADMIN — QUETIAPINE FUMARATE 12.5 MILLIGRAM(S): 200 TABLET, FILM COATED ORAL at 09:14

## 2018-03-25 RX ADMIN — HALOPERIDOL DECANOATE 1 MILLIGRAM(S): 100 INJECTION INTRAMUSCULAR at 11:38

## 2018-03-25 RX ADMIN — QUETIAPINE FUMARATE 12.5 MILLIGRAM(S): 200 TABLET, FILM COATED ORAL at 21:53

## 2018-03-25 RX ADMIN — Medication 5 MILLIGRAM(S): at 21:52

## 2018-03-25 RX ADMIN — ENOXAPARIN SODIUM 40 MILLIGRAM(S): 100 INJECTION SUBCUTANEOUS at 11:38

## 2018-03-26 PROCEDURE — 99232 SBSQ HOSP IP/OBS MODERATE 35: CPT | Mod: GC

## 2018-03-26 RX ADMIN — ENOXAPARIN SODIUM 40 MILLIGRAM(S): 100 INJECTION SUBCUTANEOUS at 11:27

## 2018-03-26 RX ADMIN — QUETIAPINE FUMARATE 12.5 MILLIGRAM(S): 200 TABLET, FILM COATED ORAL at 21:10

## 2018-03-26 RX ADMIN — Medication 5 MILLIGRAM(S): at 21:09

## 2018-03-26 NOTE — DIETITIAN INITIAL EVALUATION ADULT. - FACTORS AFF FOOD INTAKE
AMS, alzheimer's/change in mental status/difficulty with food procurement/preparation/other (specify)

## 2018-03-26 NOTE — DIETITIAN INITIAL EVALUATION ADULT. - NUTRITION INTERVENTION
Medical Food Supplements/Feeding Assistance/Collaboration and Referral of Nutrition Care/Vitamin/Meals and Snack

## 2018-03-26 NOTE — DIETITIAN INITIAL EVALUATION ADULT. - MD RECOMMEND
Add Ensure Enlive 1 can BID (700 Kcal, Protein 40 grams) to Regular diet as medically feasible/po supplement

## 2018-03-26 NOTE — DIETITIAN INITIAL EVALUATION ADULT. - OTHER INFO
Nutrition consult requested for Enteral or Parenteral nutrition PTA. Patient from home lives with . Visited pt. alert but confused & agitated, poor historian, unable to contact  connected to voicemail presently, per RN, pt. appetite good, consuming 50% of meals & tolerating with meal-set up, no reports of GI distress, pt. seen by Psych & consult noted, awaiting long team placement & followed by . Discussed with MD/RN. Nutrition consult requested for Enteral or Parenteral nutrition PTA. Patient from home lives with . Visited pt. alert but confused & agitated, poor historian, unable to contact  connected to voicemail presently, per RN, pt. appetite good, consuming 50% of meals & tolerating with meal-set up, also per flow sheet intake 85% noted,  no reports of GI distress, pt. seen by Psych & consult noted, awaiting long team placement & followed by . Skin intact. Discussed with MD/RN.

## 2018-03-26 NOTE — DIETITIAN INITIAL EVALUATION ADULT. - PROBLEM SELECTOR PLAN 3
Worsening dementia will R/O Stroke/CVA   Will hold off on Olanzapine  Seroquel 25 mg BID  Psychiatry evaluation

## 2018-03-26 NOTE — DIETITIAN INITIAL EVALUATION ADULT. - PROBLEM SELECTOR PLAN 2
Worsening dementia will R/O Stroke/CVA   Will hold off on Olanzapine  Psychiatry evaluation Dr Mckeon

## 2018-03-26 NOTE — DIETITIAN INITIAL EVALUATION ADULT. - PROBLEM SELECTOR PLAN 1
Likely from worsening dementia vs CVA vs Medication induced (Olanzapine)  Came with Disorientation and disorganized speech. Patient is AAOX1 only  Infectious work up was done: No UTI or Pneumonia. No WBC or fever. No respiratory symtomps   Neurological work up was done: CT showing Patchy hypo densities in the periventricular and subcortical white matter bilaterally, compatible with chronic small vessel ischemic disease. NO EVIDENCE OF ACUTE BLEEDING.  Will need MRI to R/O Acute bleeding  Psychiatry evaluation Dr Mckeon

## 2018-03-27 ENCOUNTER — TRANSCRIPTION ENCOUNTER (OUTPATIENT)
Age: 78
End: 2018-03-27

## 2018-03-27 VITALS
SYSTOLIC BLOOD PRESSURE: 125 MMHG | TEMPERATURE: 98 F | DIASTOLIC BLOOD PRESSURE: 75 MMHG | OXYGEN SATURATION: 98 % | HEART RATE: 78 BPM | RESPIRATION RATE: 16 BRPM

## 2018-03-27 LAB
ALBUMIN SERPL ELPH-MCNC: 3.1 G/DL — LOW (ref 3.5–5)
ALP SERPL-CCNC: 63 U/L — SIGNIFICANT CHANGE UP (ref 40–120)
ALT FLD-CCNC: 24 U/L DA — SIGNIFICANT CHANGE UP (ref 10–60)
ANION GAP SERPL CALC-SCNC: 5 MMOL/L — SIGNIFICANT CHANGE UP (ref 5–17)
AST SERPL-CCNC: 25 U/L — SIGNIFICANT CHANGE UP (ref 10–40)
BILIRUB SERPL-MCNC: 0.3 MG/DL — SIGNIFICANT CHANGE UP (ref 0.2–1.2)
BUN SERPL-MCNC: 27 MG/DL — HIGH (ref 7–18)
CALCIUM SERPL-MCNC: 8.8 MG/DL — SIGNIFICANT CHANGE UP (ref 8.4–10.5)
CHLORIDE SERPL-SCNC: 107 MMOL/L — SIGNIFICANT CHANGE UP (ref 96–108)
CO2 SERPL-SCNC: 28 MMOL/L — SIGNIFICANT CHANGE UP (ref 22–31)
CREAT SERPL-MCNC: 1.07 MG/DL — SIGNIFICANT CHANGE UP (ref 0.5–1.3)
GLUCOSE SERPL-MCNC: 94 MG/DL — SIGNIFICANT CHANGE UP (ref 70–99)
MAGNESIUM SERPL-MCNC: 2.3 MG/DL — SIGNIFICANT CHANGE UP (ref 1.6–2.6)
PHOSPHATE SERPL-MCNC: 3.6 MG/DL — SIGNIFICANT CHANGE UP (ref 2.5–4.5)
POTASSIUM SERPL-MCNC: 4.3 MMOL/L — SIGNIFICANT CHANGE UP (ref 3.5–5.3)
POTASSIUM SERPL-SCNC: 4.3 MMOL/L — SIGNIFICANT CHANGE UP (ref 3.5–5.3)
PROT SERPL-MCNC: 6.6 G/DL — SIGNIFICANT CHANGE UP (ref 6–8.3)
SODIUM SERPL-SCNC: 140 MMOL/L — SIGNIFICANT CHANGE UP (ref 135–145)

## 2018-03-27 PROCEDURE — 93005 ELECTROCARDIOGRAM TRACING: CPT

## 2018-03-27 PROCEDURE — 97162 PT EVAL MOD COMPLEX 30 MIN: CPT

## 2018-03-27 PROCEDURE — 83735 ASSAY OF MAGNESIUM: CPT

## 2018-03-27 PROCEDURE — 70551 MRI BRAIN STEM W/O DYE: CPT

## 2018-03-27 PROCEDURE — 99285 EMERGENCY DEPT VISIT HI MDM: CPT | Mod: 25

## 2018-03-27 PROCEDURE — 86780 TREPONEMA PALLIDUM: CPT

## 2018-03-27 PROCEDURE — 83036 HEMOGLOBIN GLYCOSYLATED A1C: CPT

## 2018-03-27 PROCEDURE — 80053 COMPREHEN METABOLIC PANEL: CPT

## 2018-03-27 PROCEDURE — 84100 ASSAY OF PHOSPHORUS: CPT

## 2018-03-27 PROCEDURE — 82962 GLUCOSE BLOOD TEST: CPT

## 2018-03-27 PROCEDURE — 84484 ASSAY OF TROPONIN QUANT: CPT

## 2018-03-27 PROCEDURE — 93306 TTE W/DOPPLER COMPLETE: CPT

## 2018-03-27 PROCEDURE — 81001 URINALYSIS AUTO W/SCOPE: CPT

## 2018-03-27 PROCEDURE — 80048 BASIC METABOLIC PNL TOTAL CA: CPT

## 2018-03-27 PROCEDURE — 82746 ASSAY OF FOLIC ACID SERUM: CPT

## 2018-03-27 PROCEDURE — 71045 X-RAY EXAM CHEST 1 VIEW: CPT

## 2018-03-27 PROCEDURE — 80061 LIPID PANEL: CPT

## 2018-03-27 PROCEDURE — 85027 COMPLETE CBC AUTOMATED: CPT

## 2018-03-27 PROCEDURE — 70450 CT HEAD/BRAIN W/O DYE: CPT

## 2018-03-27 PROCEDURE — 82607 VITAMIN B-12: CPT

## 2018-03-27 PROCEDURE — 87086 URINE CULTURE/COLONY COUNT: CPT

## 2018-03-27 PROCEDURE — 99239 HOSP IP/OBS DSCHRG MGMT >30: CPT

## 2018-03-27 RX ORDER — SODIUM CHLORIDE 9 MG/ML
1000 INJECTION INTRAMUSCULAR; INTRAVENOUS; SUBCUTANEOUS
Qty: 0 | Refills: 0 | Status: DISCONTINUED | OUTPATIENT
Start: 2018-03-27 | End: 2018-03-27

## 2018-03-27 RX ORDER — QUETIAPINE FUMARATE 200 MG/1
12.5 TABLET, FILM COATED ORAL DAILY
Qty: 0 | Refills: 0 | Status: DISCONTINUED | OUTPATIENT
Start: 2018-03-28 | End: 2018-03-27

## 2018-03-27 RX ORDER — OLANZAPINE 15 MG/1
1 TABLET, FILM COATED ORAL
Qty: 0 | Refills: 0 | COMMUNITY

## 2018-03-27 RX ORDER — QUETIAPINE FUMARATE 200 MG/1
0.5 TABLET, FILM COATED ORAL
Qty: 0 | Refills: 0 | COMMUNITY

## 2018-03-27 RX ORDER — QUETIAPINE FUMARATE 200 MG/1
0.5 TABLET, FILM COATED ORAL
Qty: 15 | Refills: 0 | OUTPATIENT
Start: 2018-03-27 | End: 2018-04-25

## 2018-03-27 RX ORDER — LANOLIN ALCOHOL/MO/W.PET/CERES
1 CREAM (GRAM) TOPICAL
Qty: 7 | Refills: 0 | OUTPATIENT
Start: 2018-03-27 | End: 2018-04-02

## 2018-03-27 RX ADMIN — ENOXAPARIN SODIUM 40 MILLIGRAM(S): 100 INJECTION SUBCUTANEOUS at 11:59

## 2018-03-27 RX ADMIN — QUETIAPINE FUMARATE 12.5 MILLIGRAM(S): 200 TABLET, FILM COATED ORAL at 18:41

## 2018-03-27 NOTE — DISCHARGE NOTE ADULT - HOSPITAL COURSE
78 y/o Female from home lives with  w/ significant PMHx of Dementia and Agitation (PCP started her on olanzapine) and no significant PSHx BIB family with c/o altered mental status.      Altered mental status Likely secondary to underlying progressive dementia   On Seroquel  and enhanced observation inpatient and seen by psychiatrist in patient and adjusted the medication as mentioned above.   Pt symptom well controlled with Seroquel and no additional sedation needed and pt is no longer agitated.     Discussed with attending and patient is medically clear for placement with psychiatry follow up outpatient.

## 2018-03-27 NOTE — PROGRESS NOTE ADULT - NSHPATTENDINGPLANDISCUSS_GEN_ALL_CORE
"Mother received a PC back in November from school counselor child stated that he wanted to die. Getting to the bottom of things it was determined that child said that because mother was going to be having surgery and he thought she was going to die. Child wanted to be in heaven with his mother if she  during surgery. Yesterday mother received a PC from school. Child find a blade in art class and cut his finger. It has not been determined if it was intentional. Child is stating it was an accident but school counselors think it may have been intentional. Child was also playing with the blood on the palm of his hand ""because he was bored\""   Mother currently doesn't feel like child wants to harm himself. Mother asked child if he wanted to die and child stated he doesn't. Child thinks the school is overreacting and doesn't want to talk to anybody. Mother would like child to be assessed to be sure. Advised  If there are any concerns that pt is a harm to herself/himself or someone else can call [phone] ([phone] RADHA Mulligan) . For emergent, acute issues - call [phone]  Please advise.      "
Dr. Womack PGY2
Dr. Mckeon -psychiatrist

## 2018-03-27 NOTE — DISCHARGE NOTE ADULT - CARE PROVIDER_API CALL
Kelby Villalobos  Address: [address] [place] [address]  Phone: [phone]  Phone: (   )    -  Fax: (   )    -

## 2018-03-27 NOTE — DISCHARGE NOTE ADULT - CARE PLAN
Principal Discharge DX:	Dementia  Goal:	to treat the agitation and acute symptoms  Assessment and plan of treatment:	Patient has well controlled for agitation with progressive memory problem . you are seen by psychiatric and will be safe in long term facility for farther care. Continue Seroquel as psychiatric recommended and follow up with PCP in a week .

## 2018-03-27 NOTE — PROGRESS NOTE ADULT - PROBLEM SELECTOR PLAN 3
IMPROVE VTE score: 4 Lovenox S/C  [ ] Previous VTE                                    3  [ ] Thrombophilia                                  2  [ x] Lower limb paralysis                        2  (unable to hold up >15 seconds)    [ ] Current Cancer (within 6 months)        2   [x] Immobilization > 24 hrs                    1  [ ] ICU/CCU stay > 24 hrs                      1  [x] Age > 60                                         1
Will hold off on Olanzapine  Seroquel 12.5  mg at bedtime   c/w constant observation
Will hold off on Olanzapine  Seroquel 12.5  mg at bedtime   c/w constant observation
IMPROVE VTE score: 4 Lovenox S/C  [ ] Previous VTE                                    3  [ ] Thrombophilia                                  2  [ x] Lower limb paralysis                        2  (unable to hold up >15 seconds)    [ ] Current Cancer (within 6 months)        2   [x] Immobilization > 24 hrs                    1  [ ] ICU/CCU stay > 24 hrs                      1  [x] Age > 60                                         1

## 2018-03-27 NOTE — DISCHARGE NOTE ADULT - PLAN OF CARE
to treat the agitation and acute symptoms Patient has well controlled for agitation with progressive memory problem . you are seen by psychiatric and will be safe in long term facility for farther care. Continue Seroquel as psychiatric recommended and follow up with PCP in a week .

## 2018-03-27 NOTE — DISCHARGE NOTE ADULT - PATIENT PORTAL LINK FT
You can access the [facility], offered by [facility], by registering with the following website: [email or web address]

## 2018-03-27 NOTE — PROGRESS NOTE ADULT - PROBLEM SELECTOR PLAN 1
Likely secondary to underlying dementia   No signs of infection so far: afebrile, labs within normal limits, UA (-), CXR (-)  CT head negative   MRI of brain: There is diffuse cerebral volume loss with prominence of the sulci,   fissures, and cisternal spaces which is normal for the patient's age.   There is an incidental cavum septum pellucidum et vergae. There is   disproportionate atrophy of the bilateral medial temporal lobes.     Can give Ativan 0.5 mg IV prior to MRI   Hold olanzipine for now   On Seroquel 12.5 mg PO at bedtime   Awaiting Psychiatric evaluation   Neuro evaluation depending on MRI reports
Likely secondary to underlying dementia without agitation   On Seroquel  and enhanced observation   Awaiting placement. Medically clear.   Please don't give Haldol.
Likely secondary to underlying dementia   On Seroquel  and enhanced observation   Awaiting placement. Medically clear.   Please don't give Haldol.
Likely secondary to underlying dementia   On Seroquel 12.5 mg PO at bedtime   Psychiatric evaluation done and appreciated   Neuro evaluation depending on MRI reports
Likely secondary to underlying dementia   On Seroquel 12.5 mg PO twice daily   Psychiatric evaluation done and appreciated   Patient need to be under constant observation,   Do not give Haldol
No signs of infection so far: afebrile, labs within normal limits, UA (-), CXR (-)  CT head negative   Awaiting MRI of brain   Can give Ativan 0.5 mg IV prior to MRI   Hold olanzipine for now   On Seroquel 12.5 mg PO at bedtime   Awaiting Psychiatric evaluation   Neuro evaluation depending on MRI reports

## 2018-03-27 NOTE — PROGRESS NOTE ADULT - PROBLEM SELECTOR PLAN 2
Dementia with delirium without behavior disturbance currently  on Seroquel and awaiting placement.
Dementia with delirium:   MRI finding showing b/l temporal atrophy, Alzheimer's disease can not be excluded   Spoke to Dr. Mckeon - psychiatrist who will see her today   c/w Seroquel 12.5 mg PO twice daily for now
Dementia with delirium and behavior disturbance   c/w Seroquel 12.5 mg PO twice daily for now  Family requested for long term placement. SW gave choices to the family
Dementia with delirium and behavior disturbance   c/w Seroquel 12.5 mg PO twice daily for now  Family requested for long term placement. SW gave choices to the family
Dementia with delirium and behavior disturbance   on Seroquel and awaiting placement.

## 2018-03-27 NOTE — PROGRESS NOTE ADULT - ASSESSMENT
76 y/o Female from home lives with  w/ significant PMHx of Dementia and Agitation (PCP started her on olanzapine) and no significant PSHx BIB family with c/o altered mental status.
78 y/o Female from home lives with  w/ significant PMHx of Dementia and Agitation (PCP started her on olanzapine) and no significant PSHx BIB family with c/o altered mental status since 7pm last night. Per patient  patient soiled her self at 7pm last night that was a deviation from pt's baseline.    Admitted to the floor for Altered mental status likely secondary to Likely from worsening dementia vs CVA vs Medication induced (Olanzapine).
76 y/o Female from home lives with  w/ significant PMHx of Dementia and Agitation (PCP started her on olanzapine) and no significant PSHx BIB family with c/o altered mental status since 7pm last night. Per patient  patient soiled her self at 7pm last night that was a deviation from pt's baseline.    Admitted to the floor for Altered mental status likely secondary to Likely from worsening dementia vs CVA vs Medication induced (Olanzapine).
76 y/o Female from home lives with  w/ significant PMHx of Dementia and Agitation (PCP started her on olanzapine) and no significant PSHx BIB family with c/o altered mental status since 7pm last night. Per patient  patient soiled her self at 7pm last night that was a deviation from pt's baseline.    Admitted to the floor for Altered mental status likely secondary to Likely from worsening dementia vs CVA vs Medication induced (Olanzapine).
78 y/o Female from home lives with  w/ significant PMHx of Dementia and Agitation (PCP started her on olanzapine) and no significant PSHx BIB family with c/o altered mental status since 7pm last night. Per patient  patient soiled her self at 7pm last night that was a deviation from pt's baseline.    Admitted to the floor for Altered mental status likely secondary to Likely from worsening dementia vs CVA vs Medication induced (Olanzapine).
78 y/o Female from home lives with  w/ significant PMHx of Dementia and Agitation (PCP started her on olanzapine) and no significant PSHx BIB family with c/o altered mental status.

## 2018-03-27 NOTE — DISCHARGE NOTE ADULT - PROVIDER TOKENS
FREE:[LAST:[Wilfredo],FIRST:[Berry],PHONE:[(   )    -],FAX:[(   )    -],ADDRESS:[Address: [address] [place] [address]  Phone: [phone]]]

## 2018-03-27 NOTE — DISCHARGE NOTE ADULT - MEDICATION SUMMARY - MEDICATIONS TO STOP TAKING
I will STOP taking the medications listed below when I get home from the hospital:    Namenda  --  by mouth 2 times a day    Naprosyn  --  by mouth    galantamine 16 mg oral capsule, extended release  --  by mouth    multivitamin  --   once a day    OLANZapine 5 mg oral tablet  -- 1 tab(s) by mouth 3 times a day

## 2018-03-27 NOTE — PROGRESS NOTE ADULT - SUBJECTIVE AND OBJECTIVE BOX
Patient is a 77y old  Female who presents with a chief complaint of Altered Mental Status (21 Mar 2018 17:59)      INTERVAL HPI/OVERNIGHT EVENTS: patient was seen and examined at bedside. Under 1 to 1 observation. Per PSA, overnight was restless, urinated on the floor.     MEDICATIONS  (STANDING):  dextrose 5% + sodium chloride 0.9%. 1000 milliLiter(s) (75 mL/Hr) IV Continuous <Continuous>  enoxaparin Injectable 40 milliGRAM(s) SubCutaneous daily  melatonin 5 milliGRAM(s) Oral at bedtime  QUEtiapine 12.5 milliGRAM(s) Oral at bedtime    MEDICATIONS  (PRN):  QUEtiapine 12.5 milliGRAM(s) Oral two times a day PRN agitation      Allergies    No Known Allergies    Intolerances        REVIEW OF SYSTEMS: unable to obtain secondary to dementia       Vital Signs Last 24 Hrs  T(C): 36.2 (23 Mar 2018 13:13), Max: 36.8 (22 Mar 2018 23:01)  T(F): 97.1 (23 Mar 2018 13:13), Max: 98.2 (22 Mar 2018 23:01)  HR: 58 (23 Mar 2018 13:13) (46 - 74)  BP: 142/75 (23 Mar 2018 13:13) (96/57 - 145/82)  BP(mean): --  RR: 18 (23 Mar 2018 13:13) (16 - 20)  SpO2: 100% (23 Mar 2018 13:13) (96% - 100%)    PHYSICAL EXAM:  GENERAL: NAD, anxious   HEAD:  Atraumatic, Normocephalic  EYES:  conjunctiva and sclera clear  NECK: Supple, No JVD, Normal thyroid  NERVOUS SYSTEM:  Alert & Oriented X1, No gross focal deficits  CHEST/LUNG: Clear to percussion bilaterally; No rales, rhonchi, wheezing, or rubs  HEART: Regular rate and rhythm; No murmurs, rubs, or gallops  ABDOMEN: Soft, Nontender, Nondistended; Bowel sounds present  EXTREMITIES:  No clubbing, cyanosis, or edema  SKIN: No rashes or lesions    LABS:                        12.0   7.1   )-----------( 206      ( 22 Mar 2018 06:03 )             36.9     03-23    141  |  108  |  16  ----------------------------<  92  4.0   |  28  |  0.76    Ca    8.7      23 Mar 2018 05:34  Phos  3.8       Mg     2.3         TPro  6.5  /  Alb  3.1<L>  /  TBili  0.4  /  DBili  x   /  AST  9<L>  /  ALT  13  /  AlkPhos  60        Urinalysis Basic - ( 21 Mar 2018 15:36 )    Color: Pale Yellow / Appearance: Clear / S.015 / pH: x  Gluc: x / Ketone: Negative  / Bili: Negative / Urobili: Negative   Blood: x / Protein: Negative / Nitrite: Negative   Leuk Esterase: Moderate / RBC: 0-2 /HPF / WBC 3-5 /HPF   Sq Epi: x / Non Sq Epi: Occasional /HPF / Bacteria: Few /HPF      CAPILLARY BLOOD GLUCOSE      POCT Blood Glucose.: 92 mg/dL (23 Mar 2018 10:13)      RADIOLOGY & ADDITIONAL TESTS:    Imaging Personally Reviewed:  [ ] YES  [ ] NO    Consultant(s) Notes Reviewed:  [ ] YES  [ ] NO    Care Discussed with Consultants/Other Providers [ ] YES  [ ] NO    Plan of Care discussed with Housestaff [ ]YES [ ] NO
Patient is a 77y old  Female who presents with a chief complaint of dysphagia, failure to thrive (27 Mar 2018 09:11)      INTERVAL HPI/OVERNIGHT EVENTS:  Patient seen and examined at bedside. Denies chest pain, palpitation, SOB, nausea, vomiting, abdominal pain.    T(C): 37 (03-27-18 @ 05:22), Max: 37 (03-27-18 @ 05:22)  HR: 65 (03-27-18 @ 05:22) (65 - 87)  BP: 123/79 (03-27-18 @ 05:22) (103/63 - 123/79)  RR: 16 (03-27-18 @ 05:22) (16 - 17)  SpO2: 99% (03-27-18 @ 05:22) (98% - 99%)  Wt(kg): --  I&O's Summary        REVIEW OF SYSTEMS:  No fever,   No cough, SOB  No chest pain, palpitations  No Abd pain, nausea, vomiting, No diarrhea or constipation      PHYSICAL EXAM:    Neuro: AAO x 2  EYES: EOMI, PERRLA,  NECK: Supple, No JVD, Normal thyroid  Resp: CTAB, No crackles, wheezing,   CVS: Regular rate and rhythm; No murmurs, rubs, or gallops  ABD: Soft, Nontender, Nondistended; Bowel sounds present  EXTREMITIES:  2+ Peripheral Pulses, No edema    LABS:    03-27    140  |  107  |  27<H>  ----------------------------<  94  4.3   |  28  |  1.07    Ca    8.8      27 Mar 2018 07:24  Phos  3.6     03-27  Mg     2.3     03-27    TPro  6.6  /  Alb  3.1<L>  /  TBili  0.3  /  DBili  x   /  AST  25  /  ALT  24  /  AlkPhos  63  03-27      CAPILLARY BLOOD GLUCOSE              MEDICATIONS  (STANDING):  enoxaparin Injectable 40 milliGRAM(s) SubCutaneous daily  melatonin 5 milliGRAM(s) Oral at bedtime  QUEtiapine 12.5 milliGRAM(s) Oral at bedtime  sodium chloride 0.9%. 1000 milliLiter(s) (75 mL/Hr) IV Continuous <Continuous>    MEDICATIONS  (PRN):  QUEtiapine 12.5 milliGRAM(s) Oral two times a day PRN agitation      Radiology:
Patient is a 77y old  Female who presents with a chief complaint of Altered Mental Status (21 Mar 2018 17:59)      INTERVAL HPI/OVERNIGHT EVENTS: patient was seen and examined at bedside in ED. No change since yesterday. On constant observation. She is AOx2, but can not provide any history.     MEDICATIONS  (STANDING):  dextrose 5% + sodium chloride 0.9%. 1000 milliLiter(s) (75 mL/Hr) IV Continuous <Continuous>  enoxaparin Injectable 40 milliGRAM(s) SubCutaneous daily  melatonin 5 milliGRAM(s) Oral at bedtime  QUEtiapine 12.5 milliGRAM(s) Oral at bedtime    MEDICATIONS  (PRN):      Allergies    No Known Allergies    Intolerances        REVIEW OF SYSTEMS:  CONSTITUTIONAL: No fever, weight loss, or fatigue  RESPIRATORY: No cough, wheezing, chills or hemoptysis; No shortness of breath  CARDIOVASCULAR: No chest pain, palpitations, dizziness, or leg swelling  GASTROINTESTINAL: No abdominal or epigastric pain. No nausea, vomiting, or hematemesis; No diarrhea or constipation. No melena or hematochezia.  NEUROLOGICAL: No headaches, memory loss, loss of strength, numbness, or tremors  MUSCULOSKELETAL: No joint pain or swelling; No muscle, back, or extremity pain      Vital Signs Last 24 Hrs  T(C): 36.7 (22 Mar 2018 12:22), Max: 37 (22 Mar 2018 01:10)  T(F): 98.1 (22 Mar 2018 12:22), Max: 98.6 (22 Mar 2018 01:10)  HR: 58 (22 Mar 2018 12:22) (50 - 75)  BP: 124/75 (22 Mar 2018 12:22) (119/65 - 138/90)  BP(mean): --  RR: 18 (22 Mar 2018 12:22) (17 - 18)  SpO2: 98% (22 Mar 2018 12:22) (97% - 99%)    PHYSICAL EXAM:  GENERAL: NAD, well-groomed, well-developed  HEAD:  Atraumatic, Normocephalic  EYES:  conjunctiva and sclera clear  NECK: Supple, No JVD, Normal thyroid  NERVOUS SYSTEM:  Alert & Oriented X2, No gross focal deficits  CHEST/LUNG: Clear to percussion bilaterally; No rales, rhonchi, wheezing, or rubs  HEART: Regular rate and rhythm; No murmurs, rubs, or gallops  ABDOMEN: Soft, Nontender, Nondistended; Bowel sounds present  EXTREMITIES:  No clubbing, cyanosis, or edema  SKIN: No rashes or lesions    LABS:                        12.0   7.1   )-----------( 206      ( 22 Mar 2018 06:03 )             36.9     -    141  |  106  |  17  ----------------------------<  92  4.2   |  29  |  0.98    Ca    9.0      22 Mar 2018 06:03    TPro  7.4  /  Alb  3.4<L>  /  TBili  0.2  /  DBili  x   /  AST  12  /  ALT  17  /  AlkPhos  76  03-      Urinalysis Basic - ( 21 Mar 2018 15:36 )    Color: Pale Yellow / Appearance: Clear / S.015 / pH: x  Gluc: x / Ketone: Negative  / Bili: Negative / Urobili: Negative   Blood: x / Protein: Negative / Nitrite: Negative   Leuk Esterase: Moderate / RBC: 0-2 /HPF / WBC 3-5 /HPF   Sq Epi: x / Non Sq Epi: Occasional /HPF / Bacteria: Few /HPF      CAPILLARY BLOOD GLUCOSE          RADIOLOGY & ADDITIONAL TESTS:    Imaging Personally Reviewed:  [ ] YES  [ ] NO    Consultant(s) Notes Reviewed:  [ ] YES  [ ] NO    Care Discussed with Consultants/Other Providers [ ] YES  [ ] NO    Plan of Care discussed with Housestaff [ ]YES [ ] NO
Patient is a 77y old  Female who presents with a chief complaint of dysphagia, failure to thrive (24 Mar 2018 04:50)      INTERVAL HPI/OVERNIGHT EVENTS:  Patient seen and examined at bedside. Denies chest pain, palpitation, SOB, nausea, vomiting, abdominal pain.    T(C): 36.8 (03-26-18 @ 06:43), Max: 37 (03-25-18 @ 14:21)  HR: 64 (03-26-18 @ 06:43) (60 - 78)  BP: 130/61 (03-26-18 @ 06:43) (106/69 - 132/60)  RR: 16 (03-26-18 @ 06:43) (16 - 16)  SpO2: 100% (03-26-18 @ 06:43) (96% - 100%)  Wt(kg): --  I&O's Summary        REVIEW OF SYSTEMS:  No fever,   No cough, SOB  No chest pain, palpitations  No Abd pain, nausea, vomiting, No diarrhea or constipation      PHYSICAL EXAM:    Neuro: AAO x 2  EYES: EOMI, PERRLA,  NECK: Supple, No JVD, Normal thyroid  Resp: CTAB, No crackles, wheezing,   CVS: Regular rate and rhythm; No murmurs, rubs, or gallops  ABD: Soft, Nontender, Nondistended; Bowel sounds present  EXTREMITIES:  2+ Peripheral Pulses, No edema            MEDICATIONS  (STANDING):  dextrose 5% + sodium chloride 0.9%. 1000 milliLiter(s) (75 mL/Hr) IV Continuous <Continuous>  enoxaparin Injectable 40 milliGRAM(s) SubCutaneous daily  melatonin 5 milliGRAM(s) Oral at bedtime  QUEtiapine 12.5 milliGRAM(s) Oral at bedtime    MEDICATIONS  (PRN):  QUEtiapine 12.5 milliGRAM(s) Oral two times a day PRN agitation
Patient is a 77y old  Female who presents with a chief complaint of dysphagia, failure to thrive (24 Mar 2018 04:50)      INTERVAL HPI/OVERNIGHT EVENTS: patient was seen and examined at bedside. Drowsy and cant provide any history     MEDICATIONS  (STANDING):  dextrose 5% + sodium chloride 0.9%. 1000 milliLiter(s) (75 mL/Hr) IV Continuous <Continuous>  enoxaparin Injectable 40 milliGRAM(s) SubCutaneous daily  melatonin 5 milliGRAM(s) Oral at bedtime  QUEtiapine 12.5 milliGRAM(s) Oral at bedtime    MEDICATIONS  (PRN):  QUEtiapine 12.5 milliGRAM(s) Oral two times a day PRN agitation      Allergies    No Known Allergies    Intolerances        REVIEW OF SYSTEMS:  CONSTITUTIONAL: No fever, weight loss, or fatigue  RESPIRATORY: No cough, wheezing, chills or hemoptysis; No shortness of breath  CARDIOVASCULAR: No chest pain, palpitations, dizziness, or leg swelling  GASTROINTESTINAL: No abdominal or epigastric pain. No nausea, vomiting, or hematemesis; No diarrhea or constipation. No melena or hematochezia.  NEUROLOGICAL: No headaches, memory loss, loss of strength, numbness, or tremors  MUSCULOSKELETAL: No joint pain or swelling; No muscle, back, or extremity pain      Vital Signs Last 24 Hrs  T(C): 36.8 (24 Mar 2018 15:13), Max: 36.8 (24 Mar 2018 15:13)  T(F): 98.3 (24 Mar 2018 15:13), Max: 98.3 (24 Mar 2018 15:13)  HR: 70 (24 Mar 2018 15:13) (70 - 79)  BP: 110/61 (24 Mar 2018 15:13) (110/61 - 136/67)  BP(mean): --  RR: 16 (24 Mar 2018 15:13) (16 - 18)  SpO2: 97% (24 Mar 2018 15:13) (96% - 100%)    PHYSICAL EXAM:  GENERAL: NAD, drowsy   HEAD:  Atraumatic, Normocephalic  EYES:  conjunctiva and sclera clear  NECK: Supple, No JVD, Normal thyroid  NERVOUS SYSTEM:  Alert & Oriented X1, No gross focal deficits, periods of incomprehensive speech   CHEST/LUNG: Clear to percussion bilaterally; No rales, rhonchi, wheezing, or rubs  HEART: Regular rate and rhythm; No murmurs, rubs, or gallops  ABDOMEN: Soft, Nontender, Nondistended; Bowel sounds present  EXTREMITIES:  No clubbing, cyanosis, or edema  SKIN: No rashes or lesions    LABS:                        11.0   5.6   )-----------( 183      ( 24 Mar 2018 07:03 )             34.4     03-24    141  |  107  |  13  ----------------------------<  92  4.1   |  28  |  0.96    Ca    8.7      24 Mar 2018 07:03  Phos  3.8     03-23  Mg     2.3     03-23    TPro  6.5  /  Alb  3.1<L>  /  TBili  0.4  /  DBili  x   /  AST  9<L>  /  ALT  13  /  AlkPhos  60  03-23        CAPILLARY BLOOD GLUCOSE          RADIOLOGY & ADDITIONAL TESTS:    Imaging Personally Reviewed:  [ ] YES  [ ] NO    Consultant(s) Notes Reviewed:  [x ] YES  [ ] NO    Care Discussed with Consultants/Other Providers [ ] YES  [ ] NO    Plan of Care discussed with Housestaff [ ]YES [ ] NO
Patient is a 77y old  Female who presents with a chief complaint of dysphagia, failure to thrive (24 Mar 2018 04:50)      INTERVAL HPI/OVERNIGHT EVENTS: patient was seen and examined at bedside. NO change, confused, takes gibberish. Was given haldol      MEDICATIONS  (STANDING):  dextrose 5% + sodium chloride 0.9%. 1000 milliLiter(s) (75 mL/Hr) IV Continuous <Continuous>  enoxaparin Injectable 40 milliGRAM(s) SubCutaneous daily  melatonin 5 milliGRAM(s) Oral at bedtime  QUEtiapine 12.5 milliGRAM(s) Oral at bedtime    MEDICATIONS  (PRN):  QUEtiapine 12.5 milliGRAM(s) Oral two times a day PRN agitation      Allergies    No Known Allergies    Intolerances        REVIEW OF SYSTEMS:  CONSTITUTIONAL: No fever, weight loss, or fatigue  RESPIRATORY: No cough, wheezing, chills or hemoptysis; No shortness of breath  CARDIOVASCULAR: No chest pain, palpitations, dizziness, or leg swelling  GASTROINTESTINAL: No abdominal or epigastric pain. No nausea, vomiting, or hematemesis; No diarrhea or constipation. No melena or hematochezia.  NEUROLOGICAL: No headaches, memory loss, loss of strength, numbness, or tremors  MUSCULOSKELETAL: No joint pain or swelling; No muscle, back, or extremity pain      Vital Signs Last 24 Hrs  T(C): 37 (25 Mar 2018 14:21), Max: 37 (25 Mar 2018 14:21)  T(F): 98.6 (25 Mar 2018 14:21), Max: 98.6 (25 Mar 2018 14:21)  HR: 65 (25 Mar 2018 14:21) (60 - 73)  BP: 112/65 (25 Mar 2018 14:21) (106/69 - 145/67)  BP(mean): --  RR: 16 (25 Mar 2018 14:21) (16 - 16)  SpO2: 98% (25 Mar 2018 14:21) (95% - 98%)    PHYSICAL EXAM:  GENERAL: NAD, drowsy s/p haldol   HEAD:  Atraumatic, Normocephalic  EYES: conjunctiva and sclera clear  NECK: Supple, No JVD, Normal thyroid  NERVOUS SYSTEM:  Alert & Oriented X1, No gross focal deficits  CHEST/LUNG: Clear to percussion bilaterally; No rales, rhonchi, wheezing, or rubs  HEART: Regular rate and rhythm; No murmurs, rubs, or gallops  ABDOMEN: Soft, Nontender, Nondistended; Bowel sounds present  EXTREMITIES:  No clubbing, cyanosis, or edema  SKIN: No rashes or lesions    LABS:                        11.0   5.6   )-----------( 183      ( 24 Mar 2018 07:03 )             34.4     03-24    141  |  107  |  13  ----------------------------<  92  4.1   |  28  |  0.96    Ca    8.7      24 Mar 2018 07:03          CAPILLARY BLOOD GLUCOSE          RADIOLOGY & ADDITIONAL TESTS:    Imaging Personally Reviewed:  [ ] YES  [ ] NO    Consultant(s) Notes Reviewed:  [ ] YES  [ ] NO    Care Discussed with Consultants/Other Providers [ ] YES  [ ] NO    Plan of Care discussed with Housestaff [ ]YES [ ] NO

## 2018-03-27 NOTE — DISCHARGE NOTE ADULT - MEDICATION SUMMARY - MEDICATIONS TO TAKE
I will START or STAY ON the medications listed below when I get home from the hospital:    QUEtiapine 25 mg oral tablet  -- 0.5 tab(s) by mouth once a day (at bedtime)  -- Indication: For Dementia    melatonin 5 mg oral tablet  -- 1 tab(s) by mouth once a day (at bedtime)  -- Indication: For SLeep problem I will START or STAY ON the medications listed below when I get home from the hospital:    SEROquel 25 mg oral tablet  -- 0.5  by mouth 2 times a day  -- Indication: For Agitation    melatonin 5 mg oral tablet  -- 1 tab(s) by mouth once a day (at bedtime)  -- Indication: For SLeep problem

## 2018-03-27 NOTE — PROGRESS NOTE ADULT - ATTENDING COMMENTS
Agree with above   patient was seen and examined. Was sitting next to the nursing station. Follows commands, but cant provide any history or ROS     Patient is medically stable for discharge, awaiting discharge to long term facility.
Agree with above  Patient was seen and examined at bedside. Can not provide any history. No overnight events, patient is medically stable for discharge. SW working with family on long term placement.
Awaiting long term placement
Awaiting placement to long term facility
Spoke to patients  who informed that he can not care of the patient at home. He would like to speak to SW in regards to long term placement     SW and CM notified

## 2018-03-27 NOTE — PROGRESS NOTE ADULT - PROBLEM SELECTOR PROBLEM 4
Goals of care, counseling/discussion
Prophylactic measure
Prophylactic measure
Goals of care, counseling/discussion

## 2018-03-27 NOTE — PROGRESS NOTE ADULT - PROBLEM SELECTOR PROBLEM 3
Agitation
Agitation
Prophylactic measure

## 2019-08-06 NOTE — DISCHARGE NOTE ADULT - NURSING SECTION COMPLETE
Drug use: No    Sexual activity: Yes   Lifestyle    Physical activity:     Days per week: Not on file     Minutes per session: Not on file    Stress: Not on file   Relationships    Social connections:     Talks on phone: Not on file     Gets together: Not on file     Attends Protestant service: Not on file     Active member of club or organization: Not on file     Attends meetings of clubs or organizations: Not on file     Relationship status: Not on file    Intimate partner violence:     Fear of current or ex partner: Not on file     Emotionally abused: Not on file     Physically abused: Not on file     Forced sexual activity: Not on file   Other Topics Concern    Not on file   Social History Narrative    Not on file     Current Outpatient Medications on File Prior to Visit   Medication Sig Dispense Refill    buPROPion (WELLBUTRIN XL) 300 MG extended release tablet       dicyclomine (BENTYL) 10 MG capsule       escitalopram (LEXAPRO) 10 MG tablet       azelastine (ASTELIN) 0.1 % nasal spray 1 spray by Nasal route 2 times daily Use in each nostril as directed 1 Bottle 3    pantoprazole (PROTONIX) 40 MG tablet Take 1 tablet by mouth daily 30 tablet 3    lisinopril-hydrochlorothiazide (PRINZIDE;ZESTORETIC) 20-12.5 MG per tablet Take 1 tablet by mouth daily 30 tablet 3    gabapentin (NEURONTIN) 600 MG tablet TAKE 2 TABLETS BY MOUTH 2  TIMES DAILY (TAKE 1200 MG  IN THE MORNING & 1200 MG IN THE EVENING) 360 tablet 0    montelukast (SINGULAIR) 10 MG tablet TAKE 1 TABLET BY MOUTH EVERY NIGHT 90 tablet 3    albuterol sulfate HFA (PROAIR HFA) 108 (90 Base) MCG/ACT inhaler Inhale 2 puffs into the lungs every 6 hours as needed for Wheezing 1 Inhaler 3    fluticasone (FLONASE) 50 MCG/ACT nasal spray 1 spray by Nasal route daily       No current facility-administered medications on file prior to visit. Allergies:  Ultram [tramadol];  Norco [hydrocodone-acetaminophen]; and Percocet Patient/Caregiver provided printed discharge information.

## 2019-10-03 NOTE — PATIENT PROFILE ADULT. - TEACHING/LEARNING FACTORS IMPACT ABILITY TO LEARN OTHER
Statement Selected
literacy/language/physical limitations

## 2020-10-16 NOTE — PROGRESS NOTE ADULT - PROBLEM SELECTOR PLAN 4
FULL CODE AS PER SON AND 
IMPROVE VTE score: 4 Lovenox S/C  [ ] Previous VTE                                    3  [ ] Thrombophilia                                  2  [ x] Lower limb paralysis                        2  (unable to hold up >15 seconds)    [ ] Current Cancer (within 6 months)        2   [x] Immobilization > 24 hrs                    1  [ ] ICU/CCU stay > 24 hrs                      1  [x] Age > 60                                         1
IMPROVE VTE score: 4 Lovenox S/C  [ ] Previous VTE                                    3  [ ] Thrombophilia                                  2  [ x] Lower limb paralysis                        2  (unable to hold up >15 seconds)    [ ] Current Cancer (within 6 months)        2   [x] Immobilization > 24 hrs                    1  [ ] ICU/CCU stay > 24 hrs                      1  [x] Age > 60                                         1
FULL CODE AS PER SON AND 
Detail Level: Simple
Additional Notes: Patient was recommended to continue using the mometasone as instructed.\\nPatient was recommended to continue using the bleach bath.

## 2022-03-04 NOTE — H&P ADULT - PROBLEM SELECTOR PLAN 3
X Depth Of Punch In Cm (Optional): 0 Worsening dementia with psychotic behavior? on Olanzapine  Will hold off on Olanzapine  Psychiatry evaluation Worsening dementia will R/O Stroke/CVA   Will hold off on Olanzapine  Seroquel 25 mg BID  Psychiatry evaluation

## 2022-07-28 NOTE — PATIENT PROFILE ADULT. - SURGICAL SITE INCISION
Problem: Skin Integrity  Goal: Skin integrity is maintained or improved  Outcome: Progressing     Problem: Fall Risk  Goal: Patient will remain free from falls  Outcome: Progressing     Problem: Knowledge Deficit - Standard  Goal: Patient and family/care givers will demonstrate understanding of plan of care, disease process/condition, diagnostic tests and medications  Outcome: Progressing     Problem: Communication  Goal: The ability to communicate needs accurately and effectively will improve  Outcome: Progressing     Problem: Mobility  Goal: Patient's capacity to carry out activities will improve  Outcome: Progressing     Problem: Self Care  Goal: Patient will have the ability to perform ADLs independently or with assistance (bathe, groom, dress, toilet and feed)  Outcome: Progressing     Problem: Pain - Standard  Goal: Alleviation of pain or a reduction in pain to the patient’s comfort goal  Outcome: Progressing     The patient is Stable - Low risk of patient condition declining or worsening    Shift Goals  Clinical Goals: Pt will remain free from falls    Progress made toward(s) clinical / shift goals: Fall precautions are in place. Bed is in lowest/locked position. Call light and belongings are within reach.   
CRYOSURGERY      Your doctor has used a method called cryosurgery to treat your skin condition. Cryosurgery refers to the use of very cold substances to treat a variety of skin conditions such as warts, pre-skin cancers, molluscum contagiosum, sun spots, and several benign growths. The substance we use in cryosurgery is liquid nitrogen and is so cold (-195 degrees Celsius) that is burns when administered.     Following treatment in the office, the skin may immediately burn and become red. You may find the area around the lesion is affected as well. It is sometimes necessary to treat not only the lesion, but a small area of the surrounding normal skin to achieve a good response.     A blister, and even a blood filled blister, may form after treatment.   This is a normal response. If the blister is painful, it is acceptable to sterilize a needle and with rubbing alcohol and gently pop the blister. It is important that you gently wash the area with soap and warm water as the blister fluid may contain wart virus if a wart was treated. Do no remove the roof of the blister.     The area treated can take anywhere from 1-3 weeks to heal. Healing time depends on the kind skin lesion treated, the location, and how aggressively the lesion was treated. It is recommended that the areas treated are covered with Vaseline or bacitracin ointment and a band-aid. If a band-aid is not practical, just ointment applied several times per day will do. Keeping these areas moist will speed the healing time.    Treatment with liquid nitrogen can leave a scar. In dark skin, it may be a light or dark scar, in light skin it may be a white or pink scar. These will generally fade with time, but may never go away completely.     If you have any concerns after your treatment, please feel free to call the office.       [id number] [place] [address]/ [phone] FAX/ [email or web address]   
no

## 2022-11-28 NOTE — ED PROVIDER NOTE - CPE EDP CARDIAC NORM
Other Plan: Excision malignant melanoma right hip with right sentinal node biopsy w/ Dr. Candie Sandoval @ [facility]. Date Scheduled For Excision (Optional): refer patient to consult with  for a sentinal node biopsy Anatomic Location From Referring Provider (Optional- Will Override The Chosen Location): right hip X Size Of Lesion In Cm (Optional): 0 When Should The Patient Follow-Up For Their Next Full-Body Skin Exam?: 3 Months Detail Level: Simple normal... 3

## 2023-05-25 NOTE — ED ADULT NURSE NOTE - CARDIO ASSESSMENT
